# Patient Record
Sex: FEMALE | Race: WHITE | NOT HISPANIC OR LATINO | Employment: OTHER | URBAN - NONMETROPOLITAN AREA
[De-identification: names, ages, dates, MRNs, and addresses within clinical notes are randomized per-mention and may not be internally consistent; named-entity substitution may affect disease eponyms.]

---

## 2017-10-10 ENCOUNTER — OFFICE VISIT (OUTPATIENT)
Dept: CARDIOLOGY | Facility: CLINIC | Age: 67
End: 2017-10-10

## 2017-10-10 VITALS
SYSTOLIC BLOOD PRESSURE: 116 MMHG | HEIGHT: 62 IN | HEART RATE: 96 BPM | OXYGEN SATURATION: 98 % | WEIGHT: 143.6 LBS | DIASTOLIC BLOOD PRESSURE: 78 MMHG | BODY MASS INDEX: 26.43 KG/M2

## 2017-10-10 DIAGNOSIS — R06.89 BRONCHIAL BREATH SOUND: ICD-10-CM

## 2017-10-10 DIAGNOSIS — Z82.49 FAMILY HISTORY OF HEART FAILURE: ICD-10-CM

## 2017-10-10 DIAGNOSIS — R06.02 SHORTNESS OF BREATH: ICD-10-CM

## 2017-10-10 DIAGNOSIS — R09.89 PROLONGED CAPILLARY REFILL TIME: ICD-10-CM

## 2017-10-10 DIAGNOSIS — R07.2 PRECORDIAL PAIN: Primary | ICD-10-CM

## 2017-10-10 DIAGNOSIS — R00.2 PALPITATIONS: ICD-10-CM

## 2017-10-10 DIAGNOSIS — M79.10 MYALGIA: ICD-10-CM

## 2017-10-10 DIAGNOSIS — R00.0 TACHYCARDIA: ICD-10-CM

## 2017-10-10 DIAGNOSIS — R09.89 BRUIT OF LEFT CAROTID ARTERY: ICD-10-CM

## 2017-10-10 DIAGNOSIS — R09.89 ABSENT PEDAL PULSES: ICD-10-CM

## 2017-10-10 PROBLEM — R07.9 CHEST PAIN: Status: ACTIVE | Noted: 2017-10-10

## 2017-10-10 PROBLEM — K21.9 GERD (GASTROESOPHAGEAL REFLUX DISEASE): Status: ACTIVE | Noted: 2017-10-10

## 2017-10-10 PROBLEM — R42 VERTIGO: Status: ACTIVE | Noted: 2017-10-10

## 2017-10-10 PROBLEM — C34.31 MALIGNANT NEOPLASM OF LOWER LOBE OF RIGHT LUNG (HCC): Status: ACTIVE | Noted: 2017-10-10

## 2017-10-10 PROCEDURE — 93000 ELECTROCARDIOGRAM COMPLETE: CPT | Performed by: INTERNAL MEDICINE

## 2017-10-10 PROCEDURE — 99204 OFFICE O/P NEW MOD 45 MIN: CPT | Performed by: INTERNAL MEDICINE

## 2017-10-10 RX ORDER — NITROGLYCERIN 0.4 MG/1
0.4 TABLET SUBLINGUAL
Qty: 25 TABLET | Refills: 12 | Status: SHIPPED | OUTPATIENT
Start: 2017-10-10

## 2017-10-10 RX ORDER — ASPIRIN 81 MG/1
81 TABLET ORAL DAILY
Qty: 30 TABLET | Refills: 11 | Status: SHIPPED | OUTPATIENT
Start: 2017-10-10

## 2017-10-10 RX ORDER — IBUPROFEN 800 MG/1
TABLET ORAL
COMMUNITY
Start: 2017-08-30

## 2017-10-10 RX ORDER — CYCLOBENZAPRINE HCL 5 MG
5 TABLET ORAL NIGHTLY PRN
COMMUNITY

## 2017-10-10 RX ORDER — LORATADINE 10 MG/1
CAPSULE, LIQUID FILLED ORAL DAILY
COMMUNITY

## 2017-10-10 RX ORDER — ATORVASTATIN CALCIUM 40 MG/1
40 TABLET, FILM COATED ORAL DAILY
Qty: 30 TABLET | Refills: 11 | Status: SHIPPED | OUTPATIENT
Start: 2017-10-10 | End: 2017-11-15 | Stop reason: SDUPTHER

## 2017-10-10 NOTE — PROGRESS NOTES
"Angie Sy is a 67 y.o. female     Chief Complaint   Patient presents with   • Establish Care   • Rapid Heart Rate       PROBLEM LIST:     1. Palpitations   2. Shortness of breath   2.1 H/o lung CA; right lower lobectomy 11-14-16   3. GERD  4. Family H/o heart failure     Specialty Problems     None            HPI:  Ms. Amisha sy is a 67-year-old white female referred by Dr. Avtar Sánchez for evaluation of exertional dyspnea and palpitations.    Ms. sy was in her usual state of excellent health until late last year when she developed hemoptysis.  She underwent bronchoscopy and subsequently right lower lobectomy for a 1 cm nodule which proved to be adenocarcinoma.  She had metastases to one lymph node with extranodal extension.  She subsequently underwent 4 rounds of adjuvant chemotherapy with cisplatin.  Prior to surgery the patient had mild shortness of breath with activity.  She had no chest pain or palpitations.    However, since surgery, Ms. sy is noted much more profound exertional dyspnea, palpitations, and chest discomfort.  She describes simple activities such as dusting a chair and then turning and walking several steps to  a telephone cause her to be profoundly short of breath.  She also notices increased heart rate whenever she is physically active but only when she is active.  This is a regular, rapid, and forceful beat.  If the patient stops activity her symptoms resolved to baseline fairly quickly.    Ms. couch also describes chest discomfort.  She relates a retrosternal chest \"heaviness\" that occurs very reliably with physical activity but which can also occur occasionally at rest.  She will stop activity, rest, and her symptoms will resolve within several minutes.  Occasionally, Ms. couch describes a similar set chest discomfort was causes her to be short of breath and profoundly fatigued.  She will generally take a nap in symptoms will be resolved after approximately one " hour.    The patient also describes bilateral lower extremity claudication greater on the left than on the right.  She has no symptoms of peripheral arterial disease or arterial embolic events.  She relates that she had a carotid duplex study late last year prior to her chest surgery.  Per her report she had no stress test or echocardiogram at that time.          CURRENT MEDICATION:    Current Outpatient Prescriptions   Medication Sig Dispense Refill   • cyclobenzaprine (FLEXERIL) 5 MG tablet Take 5 mg by mouth At Night As Needed for Muscle Spasms.     • ibuprofen (ADVIL,MOTRIN) 800 MG tablet prn     • Loratadine (CLARITIN) 10 MG capsule Take  by mouth Daily.     • O2 (OXYGEN) Inhale 2 L/min Every Night.     • sertraline (ZOLOFT) 50 MG tablet 25 mg Every Night.     • aspirin 81 MG EC tablet Take 1 tablet by mouth Daily. 30 tablet 11   • atorvastatin (LIPITOR) 40 MG tablet Take 1 tablet by mouth Daily. 30 tablet 11   • nitroglycerin (NITROSTAT) 0.4 MG SL tablet Place 1 tablet under the tongue Every 5 (Five) Minutes As Needed for Chest Pain. Take no more than 3 doses in 15 minutes. 25 tablet 12     No current facility-administered medications for this visit.        ALLERGIES:    Review of patient's allergies indicates no known allergies.    PAST MEDICAL HISTORY:    Past Medical History:   Diagnosis Date   • Cancer     lung cancer   • Depression    • GERD (gastroesophageal reflux disease)        SURGICAL HISTORY:    Past Surgical History:   Procedure Laterality Date   • BRONCHOSCOPY     • COLONOSCOPY     • LUNG REMOVAL, PARTIAL      RLL AND LYMPH NODES   • PARTIAL HYSTERECTOMY     • TUBAL ABDOMINAL LIGATION         SOCIAL HISTORY:    Social History     Social History   • Marital status:      Spouse name: N/A   • Number of children: N/A   • Years of education: N/A     Occupational History   • Not on file.     Social History Main Topics   • Smoking status: Former Smoker     Types: Cigarettes   • Smokeless tobacco:  Never Used      Comment: used to smoke 1 PPD, for approx. 17-20 years, quit approx. 1 yr. ago   • Alcohol use No   • Drug use: No   • Sexual activity: Not on file     Other Topics Concern   • Not on file     Social History Narrative   • No narrative on file       FAMILY HISTORY:    Family History   Problem Relation Age of Onset   • Diabetes Mother    • Hypertension Mother    • Stroke Sister    • Dementia Sister    • Diabetes Brother        Review of Systems   Constitutional: Positive for fatigue. Negative for activity change, chills, diaphoresis and fever.   HENT: Positive for ear pain (left ear).    Eyes: Positive for visual disturbance (wears glasses).   Respiratory: Positive for apnea (followed by Dr. Rebolledo ) and shortness of breath (with exertion, occurs with palpitations also). Negative for cough, choking, chest tightness, wheezing and stridor.    Cardiovascular: Positive for chest pain (midsternal, chest heaviness, occurs at rest and with exertion) and palpitations (rapid heart beat with exertion, associated symptoms shortness of breath, onset after lobectomy in  ). Negative for leg swelling.   Gastrointestinal: Negative.  Negative for abdominal pain, blood in stool (no melena, no hematuria, no hematemesis, no hematochezia, no hemoptysis), constipation, diarrhea, nausea and vomiting.   Endocrine: Negative.  Negative for cold intolerance and heat intolerance.   Genitourinary: Negative.    Musculoskeletal: Positive for arthralgias and myalgias (occurs with activity ). Negative for back pain, gait problem, joint swelling, neck pain and neck stiffness.   Skin: Negative.  Negative for color change, pallor, rash and wound.   Allergic/Immunologic: Positive for environmental allergies.   Neurological: Positive for dizziness (vertigo, imbalance) and light-headedness. Negative for tremors, seizures, syncope, facial asymmetry (no stroke like symptoms), speech difficulty, weakness, numbness and headaches.         "Occas. HX vertigo   Hematological: Negative.    Psychiatric/Behavioral: Positive for sleep disturbance.       VISIT VITALS:    /78 (BP Location: Left arm, Patient Position: Sitting)  Pulse 96  Ht 62\" (157.5 cm)  Wt 143 lb 9.6 oz (65.1 kg)  SpO2 98%  BMI 26.26 kg/m2    RECENT LABS:    Objective       Physical Exam   Constitutional: She is oriented to person, place, and time. She appears well-developed and well-nourished. No distress.   HENT:   Head: Normocephalic and atraumatic.   Eyes: Conjunctivae, EOM and lids are normal. Pupils are equal, round, and reactive to light. Lids are everted and swept, no foreign bodies found.   Wears glasses  Negative ptosis  Negative lid lag   Negative arcus senilis    Neck: Normal range of motion. Neck supple. Decreased carotid pulses (left carotid upstrokes mildly abnormal ) present. No hepatojugular reflux and no JVD present. Carotid bruit is present (soft left carotid bruit). No tracheal deviation present.   Cardiovascular: Normal rate, regular rhythm, S1 normal, S2 normal, normal heart sounds and intact distal pulses.  Exam reveals no gallop, no S3, no S4, no distant heart sounds and no friction rub.    No murmur heard.   No systolic murmur is present    No diastolic murmur is present   Pulses:       Radial pulses are 2+ on the right side, and 2+ on the left side.        Dorsalis pedis pulses are 0 on the right side, and 1+ on the left side.        Posterior tibial pulses are 0 on the right side, and 1+ on the left side.   Pulmonary/Chest: Effort normal and breath sounds normal. No respiratory distress. She has no decreased breath sounds. She has no wheezes. She has no rhonchi. She has no rales. She exhibits no tenderness.   Bronchial breath sounds BL bases    Abdominal: Soft. Bowel sounds are normal. She exhibits no distension, no abdominal bruit and no mass. There is no tenderness. There is no rebound and no guarding.   Negative organomegaly      Musculoskeletal: " Normal range of motion. She exhibits no edema (delayed capillary refill BLE 15 seconds BLE), tenderness or deformity.   Lymphadenopathy:     She has no cervical adenopathy.     She has no axillary adenopathy.   Neurological: She is alert and oriented to person, place, and time.   Skin: Skin is warm and dry. No rash noted. No erythema. No pallor.   Psychiatric: She has a normal mood and affect. Her speech is normal and behavior is normal. Judgment and thought content normal. Cognition and memory are normal.   Nursing note and vitals reviewed.        ECG 12 Lead  Date/Time: 10/10/2017 2:11 PM  Performed by: DYLAN ANDREWS  Authorized by: DYLAN ANDREWS   Rhythm: sinus rhythm  Rate: normal  QRS axis: normal  Comments: [place]              Assessment/Plan   #1.  Profound exertional dyspnea.  This may simply be due to postoperative changes in the setting of a 30 year history of smoking and chronic lung disease.  However, I think that cardiovascular etiologies need to be excluded.    #2.  Therefore, I would like to perform echocardiography to assess LV systolic and diastolic performance, to measure LV filling pressures and pulmonary pressures, and to look for restrictive or constrictive physiology.    #3.  We will perform pharmacologic stress testing as an ischemia assessment particularly given the patient's chest pain frequently associated with shortness of breath.    #4.  We will also perform lower extremity arterial duplex study given the patient's symptoms of claudication and markedly abnormal physical exam.    #5.  Empirically, we'll start aspirin 81 mg a day, 8 atorvastatin 40 mg a day, and we'll give the patient a prescription for sublingual nitroglycerin and instructions in its use.    #6.  Ms. paniagua will follow-up with Dr. Gonzalo guillen instructed, and in our office after testing or on a when necessary basis for symptoms or medication intolerance as discussed in detail today.                  Diagnosis Plan   1. Precordial pain  ECG 12 Lead    Stress Test With Myocardial Perfusion One Day    Adult Transthoracic Echo Complete W/ Cont if Necessary Per Protocol    Lipid Panel    Comprehensive Metabolic Panel    Stress Test With Myocardial Perfusion One Day    Adult Transthoracic Echo Complete W/ Cont if Necessary Per Protocol    Lipid Panel    Comprehensive Metabolic Panel   2. Shortness of breath  ECG 12 Lead    Stress Test With Myocardial Perfusion One Day    Adult Transthoracic Echo Complete W/ Cont if Necessary Per Protocol    Lipid Panel    Comprehensive Metabolic Panel    Stress Test With Myocardial Perfusion One Day    Adult Transthoracic Echo Complete W/ Cont if Necessary Per Protocol    Lipid Panel    Comprehensive Metabolic Panel   3. Tachycardia  ECG 12 Lead    Adult Transthoracic Echo Complete W/ Cont if Necessary Per Protocol    Lipid Panel    Comprehensive Metabolic Panel    Adult Transthoracic Echo Complete W/ Cont if Necessary Per Protocol    Lipid Panel    Comprehensive Metabolic Panel   4. Prolonged capillary refill time  Duplex Lower Extremity Art / Grafts - Bilateral CAR    Lipid Panel    Comprehensive Metabolic Panel    Duplex Lower Extremity Art / Grafts - Bilateral CAR    Lipid Panel    Comprehensive Metabolic Panel   5. Absent pedal pulses  Duplex Lower Extremity Art / Grafts - Bilateral CAR    Lipid Panel    Comprehensive Metabolic Panel    Duplex Lower Extremity Art / Grafts - Bilateral CAR    Lipid Panel    Comprehensive Metabolic Panel   6. Myalgia  Duplex Lower Extremity Art / Grafts - Bilateral CAR    Lipid Panel    Comprehensive Metabolic Panel    Duplex Lower Extremity Art / Grafts - Bilateral CAR    Lipid Panel    Comprehensive Metabolic Panel   7. Palpitations  Stress Test With Myocardial Perfusion One Day    Adult Transthoracic Echo Complete W/ Cont if Necessary Per Protocol    Lipid Panel    Comprehensive Metabolic Panel    Stress Test With Myocardial Perfusion One Day     Adult Transthoracic Echo Complete W/ Cont if Necessary Per Protocol    Lipid Panel    Comprehensive Metabolic Panel   8. Bruit of left carotid artery  US Carotid Bilateral    Lipid Panel    Comprehensive Metabolic Panel    US Carotid Bilateral    Lipid Panel    Comprehensive Metabolic Panel   9. Bronchial breath sound  Lipid Panel    Comprehensive Metabolic Panel    Lipid Panel    Comprehensive Metabolic Panel   10. Family history of heart failure         Return for f/u after testing .         Josr Linares MD   Scribed for Dr. Josr Linares by Wang Chavez, GRIS October 10, 2017 4:06 PM

## 2017-10-31 ENCOUNTER — TELEPHONE (OUTPATIENT)
Dept: CARDIOLOGY | Facility: CLINIC | Age: 67
End: 2017-10-31

## 2017-10-31 NOTE — TELEPHONE ENCOUNTER
Patient states since starting the atorvastin 40 mg her legs and muscles aches. Dr. Linares wants patient to cut tablet in half and try the 1/2 tablet. Patient will try and if cannot tolerate will give our office a call.         ----- Message from Silvia Arshad LPN sent at 10/31/2017  2:36 PM EDT -----  Contact: Amisha   Patient reports Dr. Linares started her on Cholesterol Medicine at her last visit and she is unable to tolerate it. She would like to know what Dr. Linares recommends.

## 2017-11-09 ENCOUNTER — HOSPITAL ENCOUNTER (OUTPATIENT)
Dept: CARDIOLOGY | Facility: HOSPITAL | Age: 67
Discharge: HOME | End: 2017-11-09
Attending: INTERNAL MEDICINE

## 2017-11-09 ENCOUNTER — OUTSIDE FACILITY SERVICE (OUTPATIENT)
Dept: CARDIOLOGY | Facility: CLINIC | Age: 67
End: 2017-11-09

## 2017-11-09 LAB
MAXIMAL PREDICTED HEART RATE: 154 BPM
MAXIMAL PREDICTED HEART RATE: 154 BPM
STRESS TARGET HR: 131 BPM
STRESS TARGET HR: 131 BPM

## 2017-11-09 PROCEDURE — 78452 HT MUSCLE IMAGE SPECT MULT: CPT | Performed by: INTERNAL MEDICINE

## 2017-11-09 PROCEDURE — 93925 LOWER EXTREMITY STUDY: CPT

## 2017-11-09 PROCEDURE — A9500 TC99M SESTAMIBI: HCPCS | Performed by: INTERNAL MEDICINE

## 2017-11-09 PROCEDURE — 0 TECHNETIUM SESTAMIBI: Performed by: INTERNAL MEDICINE

## 2017-11-09 PROCEDURE — 93306 TTE W/DOPPLER COMPLETE: CPT | Performed by: INTERNAL MEDICINE

## 2017-11-09 PROCEDURE — 25010000002 REGADENOSON 0.4 MG/5ML SOLUTION: Performed by: INTERNAL MEDICINE

## 2017-11-09 PROCEDURE — 78452 HT MUSCLE IMAGE SPECT MULT: CPT

## 2017-11-09 PROCEDURE — 93018 CV STRESS TEST I&R ONLY: CPT | Performed by: INTERNAL MEDICINE

## 2017-11-09 PROCEDURE — 93880 EXTRACRANIAL BILAT STUDY: CPT | Performed by: INTERNAL MEDICINE

## 2017-11-09 PROCEDURE — 93306 TTE W/DOPPLER COMPLETE: CPT

## 2017-11-09 PROCEDURE — 93880 EXTRACRANIAL BILAT STUDY: CPT

## 2017-11-09 PROCEDURE — 93017 CV STRESS TEST TRACING ONLY: CPT

## 2017-11-09 RX ADMIN — TECHNETIUM TC-99M SESTAMIBI 1 DOSE: 1 INJECTION INTRAVENOUS at 08:45

## 2017-11-09 RX ADMIN — REGADENOSON 0.4 MG: 0.08 INJECTION, SOLUTION INTRAVENOUS at 08:45

## 2017-11-14 ENCOUNTER — DOCUMENTATION (OUTPATIENT)
Dept: CARDIOLOGY | Facility: CLINIC | Age: 67
End: 2017-11-14

## 2017-11-15 ENCOUNTER — OFFICE VISIT (OUTPATIENT)
Dept: CARDIOLOGY | Facility: CLINIC | Age: 67
End: 2017-11-15

## 2017-11-15 VITALS
DIASTOLIC BLOOD PRESSURE: 68 MMHG | HEART RATE: 91 BPM | HEIGHT: 62 IN | SYSTOLIC BLOOD PRESSURE: 107 MMHG | OXYGEN SATURATION: 96 % | WEIGHT: 144.8 LBS | BODY MASS INDEX: 26.65 KG/M2

## 2017-11-15 DIAGNOSIS — R09.89 ABSENT PEDAL PULSES: ICD-10-CM

## 2017-11-15 DIAGNOSIS — R94.39 ABNORMAL STRESS TEST: ICD-10-CM

## 2017-11-15 DIAGNOSIS — R06.02 SHORTNESS OF BREATH: Primary | ICD-10-CM

## 2017-11-15 DIAGNOSIS — R09.89 BRUIT OF LEFT CAROTID ARTERY: ICD-10-CM

## 2017-11-15 DIAGNOSIS — R07.2 PRECORDIAL PAIN: ICD-10-CM

## 2017-11-15 DIAGNOSIS — I65.23 BILATERAL CAROTID ARTERY STENOSIS: ICD-10-CM

## 2017-11-15 DIAGNOSIS — M79.10 MYALGIA: ICD-10-CM

## 2017-11-15 PROBLEM — I65.29 CAROTID ARTERY STENOSIS: Status: ACTIVE | Noted: 2017-11-15

## 2017-11-15 PROCEDURE — 99214 OFFICE O/P EST MOD 30 MIN: CPT | Performed by: INTERNAL MEDICINE

## 2017-11-15 RX ORDER — ATORVASTATIN CALCIUM 40 MG/1
40 TABLET, FILM COATED ORAL DAILY
Qty: 30 TABLET | Refills: 11 | Status: SHIPPED | OUTPATIENT
Start: 2017-11-15 | End: 2018-04-09 | Stop reason: SDUPTHER

## 2017-11-15 RX ORDER — FAMOTIDINE 40 MG/1
TABLET, FILM COATED ORAL 2 TIMES DAILY
COMMUNITY
Start: 2017-10-26

## 2017-11-15 RX ORDER — CLOPIDOGREL BISULFATE 75 MG/1
75 TABLET ORAL DAILY
Qty: 30 TABLET | Refills: 11 | Status: SHIPPED | OUTPATIENT
Start: 2017-11-15 | End: 2017-12-21 | Stop reason: HOSPADM

## 2017-11-15 NOTE — PROGRESS NOTES
Subjective   Amisha Sy is a 67 y.o. female     Chief Complaint   Patient presents with   • Rapid Heart Rate     Here for 1-2 week testing f/u   • Shortness of Breath   • Heartburn       PROBLEM LIST:      1. Palpitations   2. Shortness of breath   2.1 H/o lung CA; right lower lobectomy 11-14-16   3. GERD  4. Family H/o heart failure   5. Echo 11/09/17 demonstrated EF 40-45%, Bauer grade 1 diastolic dysfunction, trace aortic regurgitation, Mild TR, PA pressure estimated measure 35-40 mmHg, mild to moderate pulmonic regurgitation.   6. Stress 11/09/17 demonstrated anteroseptal ischemia  7. Carotid bruit   7.1 carotid duplex 11/09/17 demonstrated Moderate internal carotid disease on the right 50-75%, with mild disease on the left internal 16-49%. Antegrade flow both vertebral arteries.    Specialty Problems        Cardiology Problems    Absent pedal pulses        Bruit of left carotid artery        Prolonged capillary refill time        Tachycardia                HPI:  Ms. couch returns for follow-up on test results.    She continues to be severely short of breath with minimal activity, and they frequently have a pressure-like tightness in her chest with physical activity.    Carotid duplex study demonstrated 50-75% stenosis in the right internal carotid artery (likely closer to 50%), with less than 50% stenosis on the left.  Lower extremity duplex study demonstrated no significant obstructive disease.    Echo mildly decreased global LV systolic function with an EF of 45-50%.  There is mild to moderate diastolic dysfunction but no pericardial effusion no evidence of constrictive or restrictive physiology.  PA systolic pressures were proximally 40.    Stress test demonstrated anteroseptal ischemia with preserved LV systolic function.            CURRENT MEDICATION:    Current Outpatient Prescriptions   Medication Sig Dispense Refill   • aspirin 81 MG EC tablet Take 1 tablet by mouth Daily. 30 tablet 11   • atorvastatin  (LIPITOR) 40 MG tablet Take 1 tablet by mouth Daily. 30 tablet 11   • cyclobenzaprine (FLEXERIL) 5 MG tablet Take 5 mg by mouth At Night As Needed for Muscle Spasms.     • famotidine (PEPCID) 40 MG tablet 2 (Two) Times a Day.     • ibuprofen (ADVIL,MOTRIN) 800 MG tablet prn     • Loratadine (CLARITIN) 10 MG capsule Take  by mouth Daily.     • O2 (OXYGEN) Inhale 2 L/min Every Night.     • sertraline (ZOLOFT) 50 MG tablet 25 mg Every Night.     • clopidogrel (PLAVIX) 75 MG tablet Take 1 tablet by mouth Daily. 30 tablet 11   • Coenzyme Q10 (CO Q 10) 100 MG capsule Take 100 mg by mouth 2 (Two) Times a Day. 60 capsule 11   • nitroglycerin (NITROSTAT) 0.4 MG SL tablet Place 1 tablet under the tongue Every 5 (Five) Minutes As Needed for Chest Pain. Take no more than 3 doses in 15 minutes. 25 tablet 12     No current facility-administered medications for this visit.        ALLERGIES:    Review of patient's allergies indicates no known allergies.    PAST MEDICAL HISTORY:    Past Medical History:   Diagnosis Date   • Cancer     lung cancer   • Depression    • GERD (gastroesophageal reflux disease)        SURGICAL HISTORY:    Past Surgical History:   Procedure Laterality Date   • BRONCHOSCOPY     • COLONOSCOPY     • LUNG REMOVAL, PARTIAL      RLL AND LYMPH NODES   • PARTIAL HYSTERECTOMY     • TUBAL ABDOMINAL LIGATION         SOCIAL HISTORY:    Social History     Social History   • Marital status:      Spouse name: N/A   • Number of children: N/A   • Years of education: N/A     Occupational History   • Not on file.     Social History Main Topics   • Smoking status: Former Smoker     Types: Cigarettes   • Smokeless tobacco: Never Used      Comment: used to smoke 1 PPD, for approx. 17-20 years, quit approx. 1 yr. ago   • Alcohol use No   • Drug use: No   • Sexual activity: Not on file     Other Topics Concern   • Not on file     Social History Narrative       FAMILY HISTORY:    Family History   Problem Relation Age of  "Onset   • Diabetes Mother    • Hypertension Mother    • Stroke Sister    • Dementia Sister    • Diabetes Brother        Review of Systems   Constitutional: Positive for fatigue. Negative for activity change (stable strenght and stamina), chills and diaphoresis.   HENT: Negative.    Eyes: Positive for visual disturbance (wears glasses).   Respiratory: Positive for shortness of breath (with min. exertion ). Negative for apnea, cough, choking, chest tightness, wheezing and stridor.    Cardiovascular: Positive for chest pain (mild ). Negative for palpitations and leg swelling.   Gastrointestinal: Negative.  Negative for abdominal pain, blood in stool (no melena, no hematuria, no hematemesis, no hematochezia), constipation, diarrhea, nausea and vomiting.   Endocrine: Negative.  Negative for cold intolerance and heat intolerance.   Genitourinary: Negative.    Musculoskeletal: Positive for arthralgias and myalgias.   Skin: Negative.  Negative for color change, pallor, rash and wound.   Allergic/Immunologic: Positive for environmental allergies.   Neurological: Positive for dizziness (occas. Hx vertigo). Negative for tremors, seizures, syncope, facial asymmetry, speech difficulty, weakness, light-headedness (no stroke like symptoms), numbness and headaches.   Hematological: Bruises/bleeds easily.   Psychiatric/Behavioral: Positive for sleep disturbance.       VISIT VITALS:  Vitals:    11/15/17 0956   BP: 107/68   BP Location: Left arm   Patient Position: Sitting   Pulse: 91   SpO2: 96%   Weight: 144 lb 12.8 oz (65.7 kg)   Height: 62\" (157.5 cm)      /68 (BP Location: Left arm, Patient Position: Sitting)  Pulse 91  Ht 62\" (157.5 cm)  Wt 144 lb 12.8 oz (65.7 kg)  SpO2 96%  BMI 26.48 kg/m2    RECENT LABS:    Objective       Physical Exam    Procedures      Assessment/Plan    #1.  Given the patient's low level symptoms, multiple risk factors for coronary artery disease, I commended peripheral arterial disease, and " positive stress test I feel we need to proceed to cardiac catheterization for definitive assessment of coronary anatomy for diagnostic and potentially for therapeutic purposes.    #2 risks and benefits of that procedure were explained to the patient in detail and she wishes to proceed.    #3.  Ms. couch is decreased rate atorvastatin because of muscle cramping.  I would like her to start coenzyme Q10 100 mg twice daily and tonic water 6 ounces at bedtime and attempt to increase her atorvastatin back to 40 mg daily.    #4.  We will add Plavix 75 mg daily precatheterization.    #5.  The patient was given options to report immediately for any chest pain not rapidly resolve with rest.  We will also give the patient prescription for sublingual nitroglycerin and instructions in its use.    #6.  Ms. paniagua will follow-up with Dr. Sánchez's instructed, with other recommendations from our office based on findings at catheterization.      #7.  Note face-to-face time with this patient was 30 minutes including review of test results, counseling and cardiac catheterization, explanation of risks and benefits and medication changes.                 Diagnosis Plan   1. Shortness of breath  Cardiac catheterization   2. Precordial pain  Cardiac catheterization   3. Bilateral carotid artery stenosis  Cardiac catheterization   4. Abnormal stress test  Cardiac catheterization   5. Absent pedal pulses     6. Bruit of left carotid artery     7. Myalgia         Return for f/u after cardiac cath .         Josr Linares MD   Scribed for Dr. Josr Linares by Wang Chavez, GRIS November 15, 2017 11:14 AM

## 2017-12-08 ENCOUNTER — OUTSIDE FACILITY SERVICE (OUTPATIENT)
Dept: CARDIOLOGY | Facility: CLINIC | Age: 67
End: 2017-12-08

## 2017-12-08 PROCEDURE — 93458 L HRT ARTERY/VENTRICLE ANGIO: CPT | Performed by: INTERNAL MEDICINE

## 2017-12-21 ENCOUNTER — OFFICE VISIT (OUTPATIENT)
Dept: CARDIOLOGY | Facility: CLINIC | Age: 67
End: 2017-12-21

## 2017-12-21 VITALS
SYSTOLIC BLOOD PRESSURE: 121 MMHG | OXYGEN SATURATION: 94 % | DIASTOLIC BLOOD PRESSURE: 72 MMHG | HEIGHT: 62 IN | WEIGHT: 147.8 LBS | BODY MASS INDEX: 27.2 KG/M2 | HEART RATE: 90 BPM

## 2017-12-21 DIAGNOSIS — I25.10 CORONARY ARTERY DISEASE INVOLVING NATIVE CORONARY ARTERY OF NATIVE HEART WITHOUT ANGINA PECTORIS: ICD-10-CM

## 2017-12-21 DIAGNOSIS — I65.29 STENOSIS OF CAROTID ARTERY, UNSPECIFIED LATERALITY: Primary | ICD-10-CM

## 2017-12-21 DIAGNOSIS — R09.89 CAROTID BRUIT, UNSPECIFIED LATERALITY: ICD-10-CM

## 2017-12-21 PROCEDURE — 99214 OFFICE O/P EST MOD 30 MIN: CPT | Performed by: PHYSICIAN ASSISTANT

## 2017-12-21 NOTE — PROGRESS NOTES
Problem list     Subjective   Amisha Sy is a 67 y.o. female     Chief Complaint   Patient presents with   • Rapid Heart Rate     Here for heart cath. f/u   • Carotid Artery Disease   • Heartburn       HPI    PROBLEM LIST:      1. Palpitations   2. Shortness of breath   2.1 H/o lung CA; right lower lobectomy 11-14-16   3. GERD  4. Family H/o heart failure   5. Echo 11/09/17 demonstrated EF 40-45%, Bauer grade 1 diastolic dysfunction, trace aortic regurgitation, Mild TR, PA pressure estimated measure 35-40 mmHg, mild to moderate pulmonic regurgitation.   6. Stress 11/09/17 demonstrated anteroseptal ischemia  7. Carotid bruit   7.1 carotid duplex 11/09/17 demonstrated Moderate internal carotid disease on the right 50-75%, with mild disease on the left internal 16-49%. Antegrade flow both vertebral arteries.       Patient is a 67-year-old female that presents back from catheterization.  Catheterization demonstrated very minimal epicardial coronary artery disease.  She has good LV function.    She has no chest pain or pressure.  She has mild to moderate dyspnea baseline but nothing progressive.  Denies PND orthopnea.  She does palpitate on occasion.  She does not have dizziness presyncope or syncope.  She does complain of significant fatigue since diagnosis of cancer.  Otherwise is doing well    Lower arterial duplex demonstrated nonobstructive disease bilaterally and very minimal atherosclerosis      Outpatient Encounter Prescriptions as of 12/21/2017   Medication Sig Dispense Refill   • aspirin 81 MG EC tablet Take 1 tablet by mouth Daily. 30 tablet 11   • atorvastatin (LIPITOR) 40 MG tablet Take 1 tablet by mouth Daily. 30 tablet 11   • Coenzyme Q10 (CO Q 10) 100 MG capsule Take 100 mg by mouth 2 (Two) Times a Day. 60 capsule 11   • cyclobenzaprine (FLEXERIL) 5 MG tablet Take 5 mg by mouth At Night As Needed for Muscle Spasms.     • famotidine (PEPCID) 40 MG tablet 2 (Two) Times a Day.     • ibuprofen (ADVIL,MOTRIN)  800 MG tablet prn     • Loratadine (CLARITIN) 10 MG capsule Take  by mouth Daily.     • O2 (OXYGEN) Inhale 2 L/min Every Night.     • sertraline (ZOLOFT) 50 MG tablet 50 mg Daily.     • nitroglycerin (NITROSTAT) 0.4 MG SL tablet Place 1 tablet under the tongue Every 5 (Five) Minutes As Needed for Chest Pain. Take no more than 3 doses in 15 minutes. 25 tablet 12   • [DISCONTINUED] clopidogrel (PLAVIX) 75 MG tablet Take 1 tablet by mouth Daily. 30 tablet 11     No facility-administered encounter medications on file as of 12/21/2017.        Review of patient's allergies indicates no known allergies.    Past Medical History:   Diagnosis Date   • Cancer     lung cancer   • Depression    • GERD (gastroesophageal reflux disease)        Social History     Social History   • Marital status:      Spouse name: N/A   • Number of children: N/A   • Years of education: N/A     Occupational History   • Not on file.     Social History Main Topics   • Smoking status: Former Smoker     Types: Cigarettes   • Smokeless tobacco: Never Used      Comment: used to smoke 1 PPD, for approx. 17-20 years, quit approx. 1 yr. ago   • Alcohol use No   • Drug use: No   • Sexual activity: Not on file     Other Topics Concern   • Not on file     Social History Narrative       Family History   Problem Relation Age of Onset   • Diabetes Mother    • Hypertension Mother    • Stroke Sister    • Dementia Sister    • Diabetes Brother        Review of Systems   Constitutional: Positive for fatigue.   HENT: Positive for sore throat.    Eyes: Positive for visual disturbance (wears glasses).   Respiratory: Positive for shortness of breath.    Cardiovascular: Negative.    Gastrointestinal: Negative.    Endocrine: Negative.    Genitourinary: Negative.    Musculoskeletal: Positive for arthralgias and myalgias.   Skin: Negative.    Allergic/Immunologic: Positive for environmental allergies.   Neurological: Positive for light-headedness.        HX vertigo  "  Hematological: Bruises/bleeds easily.   Psychiatric/Behavioral: Negative.        Objective   Vitals:    12/21/17 0943   BP: 121/72   BP Location: Left arm   Patient Position: Sitting   Pulse: 90   SpO2: 94%   Weight: 67 kg (147 lb 12.8 oz)   Height: 157.5 cm (62.01\")      /72 (BP Location: Left arm, Patient Position: Sitting)  Pulse 90  Ht 157.5 cm (62.01\")  Wt 67 kg (147 lb 12.8 oz)  SpO2 94%  BMI 27.03 kg/m2    Lab Results (most recent)     None          Physical Exam   Constitutional: She is oriented to person, place, and time. She appears well-developed and well-nourished. No distress.   HENT:   Head: Normocephalic and atraumatic.   Eyes: EOM are normal. Pupils are equal, round, and reactive to light.   Neck: No JVD present.   Cardiovascular: Normal rate, regular rhythm and normal heart sounds.  Exam reveals no gallop and no friction rub.    No murmur heard.  Pulmonary/Chest: Effort normal and breath sounds normal. No respiratory distress. She has no wheezes. She has no rales. She exhibits no tenderness.   Musculoskeletal: Normal range of motion. She exhibits no edema.   Neurological: She is alert and oriented to person, place, and time. No cranial nerve deficit.   Skin: Skin is warm and dry. No rash noted. No erythema. No pallor.   Psychiatric: She has a normal mood and affect. Her behavior is normal.   Nursing note and vitals reviewed.      Procedure   Procedures       Assessment/Plan     Problems Addressed this Visit        Cardiovascular and Mediastinum    Stenosis of carotid artery - Primary    Relevant Orders    CT Angiogram Carotids    Carotid bruit    Relevant Orders    CT Angiogram Carotids    Coronary artery disease involving native coronary artery of native heart without angina pectoris    Relevant Orders    CT Angiogram Carotids            Recommendation  1.  Cardiac catheterization demonstrated normal coronaries and preserved LV function.  Nothing further from a cardiac standpoint.  2.  " Chronic duplex demonstrates potentially hemodynamically significant stenosis involving the right internal carotid artery.  We'll schedule for CT angiogram to better evaluate.  3.  We will see her back for follow-up on CAT scan.  Follow-up primary as scheduled         Electronically signed by:

## 2017-12-27 ENCOUNTER — TELEPHONE (OUTPATIENT)
Dept: CARDIOLOGY | Facility: CLINIC | Age: 67
End: 2017-12-27

## 2017-12-27 DIAGNOSIS — Z79.899 ENCOUNTER FOR LONG-TERM (CURRENT) USE OF MEDICATIONS: ICD-10-CM

## 2017-12-27 DIAGNOSIS — I25.10 CORONARY ARTERY DISEASE INVOLVING NATIVE CORONARY ARTERY OF NATIVE HEART WITHOUT ANGINA PECTORIS: Primary | ICD-10-CM

## 2017-12-27 DIAGNOSIS — I10 ESSENTIAL HYPERTENSION: ICD-10-CM

## 2017-12-27 NOTE — TELEPHONE ENCOUNTER
----- Message from Kiara Wagner sent at 12/27/2017  8:33 AM EST -----   Needs bmp ordered prior to CTA Carotid per [facility]

## 2018-04-09 DIAGNOSIS — E78.5 DYSLIPIDEMIA: Primary | ICD-10-CM

## 2018-04-09 RX ORDER — ATORVASTATIN CALCIUM 40 MG/1
40 TABLET, FILM COATED ORAL NIGHTLY
Qty: 90 TABLET | Refills: 3 | Status: SHIPPED | OUTPATIENT
Start: 2018-04-09 | End: 2018-06-26 | Stop reason: SDUPTHER

## 2018-06-26 DIAGNOSIS — E78.5 DYSLIPIDEMIA: ICD-10-CM

## 2018-06-26 RX ORDER — ATORVASTATIN CALCIUM 40 MG/1
40 TABLET, FILM COATED ORAL NIGHTLY
Qty: 90 TABLET | Refills: 3 | Status: SHIPPED | OUTPATIENT
Start: 2018-06-26